# Patient Record
Sex: FEMALE | Race: OTHER | HISPANIC OR LATINO | URBAN - METROPOLITAN AREA
[De-identification: names, ages, dates, MRNs, and addresses within clinical notes are randomized per-mention and may not be internally consistent; named-entity substitution may affect disease eponyms.]

---

## 2019-09-09 VITALS
TEMPERATURE: 98 F | OXYGEN SATURATION: 99 % | RESPIRATION RATE: 18 BRPM | DIASTOLIC BLOOD PRESSURE: 80 MMHG | SYSTOLIC BLOOD PRESSURE: 137 MMHG | HEART RATE: 66 BPM | WEIGHT: 124.34 LBS | HEIGHT: 59 IN

## 2019-09-09 NOTE — ASU PREOP CHECKLIST - 3.
potassium 2.9 - Dr. Arizmendi/ Dr. Thomas aware- patient can go home as per Dr. Arizmendi/ Dr. Raya, Manager LAUREN Meng notified, patient to see Dr. Arizmendi in am at his office

## 2019-09-10 ENCOUNTER — OUTPATIENT (OUTPATIENT)
Dept: OUTPATIENT SERVICES | Facility: HOSPITAL | Age: 59
LOS: 1 days | Discharge: HOME | End: 2019-09-10
Payer: COMMERCIAL

## 2019-09-10 DIAGNOSIS — Z98.890 OTHER SPECIFIED POSTPROCEDURAL STATES: Chronic | ICD-10-CM

## 2019-09-10 DIAGNOSIS — N84.2 POLYP OF VAGINA: Chronic | ICD-10-CM

## 2019-09-10 LAB
ANION GAP SERPL CALC-SCNC: 10 MMOL/L — SIGNIFICANT CHANGE UP (ref 5–17)
BLD GP AB SCN SERPL QL: NEGATIVE — SIGNIFICANT CHANGE UP
BUN SERPL-MCNC: 14 MG/DL — SIGNIFICANT CHANGE UP (ref 7–23)
CALCIUM SERPL-MCNC: 9 MG/DL — SIGNIFICANT CHANGE UP (ref 8.4–10.5)
CHLORIDE SERPL-SCNC: 103 MMOL/L — SIGNIFICANT CHANGE UP (ref 96–108)
CO2 SERPL-SCNC: 28 MMOL/L — SIGNIFICANT CHANGE UP (ref 22–31)
CREAT SERPL-MCNC: 0.73 MG/DL — SIGNIFICANT CHANGE UP (ref 0.5–1.3)
GLUCOSE BLDC GLUCOMTR-MCNC: 106 MG/DL — HIGH (ref 70–99)
GLUCOSE SERPL-MCNC: 109 MG/DL — HIGH (ref 70–99)
POTASSIUM SERPL-MCNC: 2.9 MMOL/L — CRITICAL LOW (ref 3.5–5.3)
POTASSIUM SERPL-SCNC: 2.9 MMOL/L — CRITICAL LOW (ref 3.5–5.3)
RH IG SCN BLD-IMP: POSITIVE — SIGNIFICANT CHANGE UP
SODIUM SERPL-SCNC: 141 MMOL/L — SIGNIFICANT CHANGE UP (ref 135–145)

## 2019-09-10 PROCEDURE — 86901 BLOOD TYPING SEROLOGIC RH(D): CPT

## 2019-09-10 PROCEDURE — 86850 RBC ANTIBODY SCREEN: CPT

## 2019-09-10 PROCEDURE — 57288 REPAIR BLADDER DEFECT: CPT | Mod: 73

## 2019-09-10 PROCEDURE — 82962 GLUCOSE BLOOD TEST: CPT

## 2019-09-10 PROCEDURE — 86900 BLOOD TYPING SEROLOGIC ABO: CPT

## 2019-09-10 PROCEDURE — 80048 BASIC METABOLIC PNL TOTAL CA: CPT

## 2019-09-10 NOTE — CHART NOTE - NSCHARTNOTEFT_GEN_A_CORE
Patient here for scheduled TVT and preop labs significant for K 2.9. Anesthesia consulted and given low K, decision made to cancel the surgery. Plan discussed with Dr Arizmendi bedside. Patient will follow up with her PCP regarding management of K and her HTN on HCTZ.

## 2021-04-12 PROBLEM — I10 ESSENTIAL (PRIMARY) HYPERTENSION: Chronic | Status: ACTIVE | Noted: 2019-09-09

## 2021-04-12 PROBLEM — R01.1 CARDIAC MURMUR, UNSPECIFIED: Chronic | Status: ACTIVE | Noted: 2019-09-09

## 2021-04-12 PROBLEM — R73.9 HYPERGLYCEMIA, UNSPECIFIED: Chronic | Status: ACTIVE | Noted: 2019-09-09

## 2021-04-12 PROBLEM — R32 UNSPECIFIED URINARY INCONTINENCE: Chronic | Status: ACTIVE | Noted: 2019-09-09

## 2021-04-23 ENCOUNTER — APPOINTMENT (OUTPATIENT)
Dept: OTOLARYNGOLOGY | Facility: CLINIC | Age: 61
End: 2021-04-23
Payer: MEDICAID

## 2021-04-23 VITALS
BODY MASS INDEX: 25.2 KG/M2 | SYSTOLIC BLOOD PRESSURE: 160 MMHG | DIASTOLIC BLOOD PRESSURE: 99 MMHG | HEIGHT: 59 IN | WEIGHT: 125 LBS | TEMPERATURE: 98.4 F | HEART RATE: 73 BPM

## 2021-04-23 DIAGNOSIS — H49.20 SIXTH [ABDUCENT] NERVE PALSY, UNSPECIFIED EYE: ICD-10-CM

## 2021-04-23 DIAGNOSIS — Z86.39 PERSONAL HISTORY OF OTHER ENDOCRINE, NUTRITIONAL AND METABOLIC DISEASE: ICD-10-CM

## 2021-04-23 DIAGNOSIS — Z78.9 OTHER SPECIFIED HEALTH STATUS: ICD-10-CM

## 2021-04-23 DIAGNOSIS — R73.03 PREDIABETES.: ICD-10-CM

## 2021-04-23 DIAGNOSIS — Z86.79 PERSONAL HISTORY OF OTHER DISEASES OF THE CIRCULATORY SYSTEM: ICD-10-CM

## 2021-04-23 PROBLEM — Z00.00 ENCOUNTER FOR PREVENTIVE HEALTH EXAMINATION: Status: ACTIVE | Noted: 2021-04-23

## 2021-04-23 PROCEDURE — 92550 TYMPANOMETRY & REFLEX THRESH: CPT

## 2021-04-23 PROCEDURE — 99204 OFFICE O/P NEW MOD 45 MIN: CPT

## 2021-04-23 PROCEDURE — 99072 ADDL SUPL MATRL&STAF TM PHE: CPT

## 2021-04-23 PROCEDURE — 92557 COMPREHENSIVE HEARING TEST: CPT

## 2021-04-23 NOTE — CONSULT LETTER
[Dear  ___] : Dear  [unfilled], [Courtesy Letter:] : I had the pleasure of seeing your patient, [unfilled], in my office today. [Sincerely,] : Sincerely, [Consult Closing:] : Thank you very much for allowing me to participate in the care of this patient.  If you have any questions, please do not hesitate to contact me. [FreeTextEntry3] : Zia Mercer MD\par Department of Otolaryngology - Head and Neck Surgery\par [facility]

## 2021-04-23 NOTE — DATA REVIEWED
[No studies available for review at this time] : No studies available for review at this time [de-identified] : see hpi

## 2021-04-23 NOTE — ASSESSMENT
[FreeTextEntry1] : 61F here for initial evaluation. For the past 4 weeks or so, she reports hearing a loud humming noise in her left ear. There is no otorrhea, otalgia or vertigo, no hearing loss. One week later, she awoke with double vision when she looks to the right and was told that she has an injured nerve in her left eye. She has since seen several specialists and gotten imaging (CT and MRI), but does not have any imaging with her at this time nor is it available to me. Of note, she reports having surgery on her left ear as a child in the 1980s.\par Audiogram from today shows essentially symmetric normal hearing sloping to mild/moderate SNHL rising to normal hearing with sloght asymmetry at 1 and 8lhz, left side worse. On exam, the left TM is intact and postsurgical but mobile. 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 The rest of the complete and comprehensive head and neck exam is unremarkable and all other cranial nerves are intact.\par She has an acute left abducens paralysis in addition to acute left tinnitus. I am unsure if they are related at this time, although possible with various petrous apex pathologies. Audiometry is unremarkable. She will get me copy of her MRI and CT ASAP for review and see me next week. She will also see her ophthalmologist as scheduled as well.

## 2021-04-23 NOTE — PHYSICAL EXAM
[Midline] : trachea located in midline position [Normal] : no rashes [FreeTextEntry1] : Ad: EAC clear, TM intact and mobile, ME clear\par As: EAC clear, TM intact/postsugical?, mobile, ME clear [de-identified] : left abducent paralysis [de-identified] : CN2-5, 7, 10-12 grossly symmetric and intact

## 2021-04-23 NOTE — HISTORY OF PRESENT ILLNESS
[de-identified] : 61F here for initial evaluation.\par \par For the past 4 weeks or so, she reports hearing a loud humming noise in her left ear. There is no otorrhea, otalgia or vertigo, no hearing loss. One week later, she awoke with double vision when she looks to the right and was told that she has an injured nerve in her left eye.\par She has since seen several specialists and gotten imaging (CT and MRI), but does not have any imaging with her at this time nor is it available to me.\par Of note, she reports having surgery on her left ear as a child in the 1980s.\par \par Audiogram from today (asymmetry at 1 and 8khz, left worse):\par R ear: hearing wnl sloping to mild SNHL rising to normal hearing. SRT 30, 100% discrim\par L ear: hearing wnl sloping to mild/moderate SNHL rising to normal hearing, SRT 35, 100% discrim\par \par ROS otherwise unremarkable

## 2021-04-30 RX ORDER — CLONAZEPAM 0.5 MG/1
0.5 TABLET ORAL
Qty: 40 | Refills: 0 | Status: ACTIVE | COMMUNITY
Start: 2021-04-30 | End: 1900-01-01

## 2021-09-09 ENCOUNTER — APPOINTMENT (OUTPATIENT)
Dept: OTOLARYNGOLOGY | Facility: CLINIC | Age: 61
End: 2021-09-09
Payer: MEDICAID

## 2021-09-09 VITALS
SYSTOLIC BLOOD PRESSURE: 150 MMHG | HEIGHT: 59 IN | WEIGHT: 125 LBS | DIASTOLIC BLOOD PRESSURE: 85 MMHG | BODY MASS INDEX: 25.2 KG/M2 | TEMPERATURE: 97.4 F | HEART RATE: 73 BPM

## 2021-09-09 DIAGNOSIS — H93.12 TINNITUS, LEFT EAR: ICD-10-CM

## 2021-09-09 PROCEDURE — 99214 OFFICE O/P EST MOD 30 MIN: CPT

## 2021-09-09 NOTE — HISTORY OF PRESENT ILLNESS
[de-identified] : 61F here in followup.\par \par Since last seen 6-7 months ago, she is doing ok. There is still a loud humming noise in her left ear; this has not changed in character and persists. There is no otorrhea, otalgia or vertigo, no hearing loss. Her prior double vision, however, has nearly completely resolved. Imaging (CT and MRI) are unremarkable.\par Of note, she reports having surgery on her left ear as a child in the 1980s.\par \par Audiogram from 4/2021 (asymmetry at 1 and 8khz, left worse):\par R ear: hearing wnl sloping to mild SNHL rising to normal hearing. SRT 30, 100% discrim\par L ear: hearing wnl sloping to mild/moderate SNHL rising to normal hearing, SRT 35, 100% discrim\par \par ROS otherwise unremarkable

## 2021-09-09 NOTE — DATA REVIEWED
[No studies available for review at this time] : No studies available for review at this time [de-identified] : see hpi

## 2021-09-09 NOTE — ASSESSMENT
[FreeTextEntry1] : 61F here in followup. Since last seen 6-7 months ago, she is doing ok. There is still a loud humming noise in her left ear; this has not changed in character and persists. There is no otorrhea, otalgia or vertigo, no hearing loss. Her prior double vision, however, has nearly completely resolved. Imaging (CT and MRI) are unremarkable. Of note, she reports having surgery on her left ear as a child in the 1980s.61F here for initial evaluation. Audiogram from prior visit shows essentially symmetric normal hearing sloping to mild/moderate SNHL rising to normal hearing with sloght asymmetry at 1 and 8khz, left side worse. On exam, the left TM is intact and postsurgical but mobile. Extraocular muscle movement is grossly intact. The rest of the complete and comprehensive head and neck exam is unremarkable and all other cranial nerves are intact.\par Her prior acute left abducens paralysis has resolved. However, left tinnitus remains. I am unsure if they are related at this time, although possible with various petrous apex pathologies. Prior audiometry and imaging are unremarkable. Discussed lifestyle measures, masking techniques, diet to minimize tinnitus. RTO 6 months in followup.

## 2021-09-09 NOTE — CONSULT LETTER
[Dear  ___] : Dear  [unfilled], [Courtesy Letter:] : I had the pleasure of seeing your patient, [unfilled], in my office today. [Consult Closing:] : Thank you very much for allowing me to participate in the care of this patient.  If you have any questions, please do not hesitate to contact me. [Sincerely,] : Sincerely, [FreeTextEntry3] : Zia Mercer MD\par Department of Otolaryngology - Head and Neck Surgery\par [facility]

## 2021-09-09 NOTE — PHYSICAL EXAM
[Midline] : trachea located in midline position [Normal] : no rashes [de-identified] : EOMI [de-identified] : CN2-5, 7, 10-12 grossly symmetric and intact [FreeTextEntry1] : Ad: EAC clear, TM intact and mobile, ME clear\par As: EAC clear, TM intact/postsugical, mobile, ME clear

## 2022-03-08 ENCOUNTER — APPOINTMENT (OUTPATIENT)
Dept: OTOLARYNGOLOGY | Facility: CLINIC | Age: 62
End: 2022-03-08

## 2022-03-09 ENCOUNTER — APPOINTMENT (OUTPATIENT)
Dept: OTOLARYNGOLOGY | Facility: CLINIC | Age: 62
End: 2022-03-09

## 2024-05-15 ENCOUNTER — APPOINTMENT (OUTPATIENT)
Dept: ORTHOPEDIC SURGERY | Facility: CLINIC | Age: 64
End: 2024-05-15

## 2024-06-12 ENCOUNTER — APPOINTMENT (OUTPATIENT)
Dept: ORTHOPEDIC SURGERY | Facility: CLINIC | Age: 64
End: 2024-06-12
Payer: MEDICAID

## 2024-06-12 VITALS — WEIGHT: 123 LBS | HEIGHT: 59 IN | BODY MASS INDEX: 24.8 KG/M2

## 2024-06-12 DIAGNOSIS — Z82.61 FAMILY HISTORY OF ARTHRITIS: ICD-10-CM

## 2024-06-12 DIAGNOSIS — M21.161 VARUS DEFORMITY, NOT ELSEWHERE CLASSIFIED, RIGHT KNEE: ICD-10-CM

## 2024-06-12 DIAGNOSIS — Z87.39 PERSONAL HISTORY OF OTHER DISEASES OF THE MUSCULOSKELETAL SYSTEM AND CONNECTIVE TISSUE: ICD-10-CM

## 2024-06-12 DIAGNOSIS — Z82.49 FAMILY HISTORY OF ISCHEMIC HEART DISEASE AND OTHER DISEASES OF THE CIRCULATORY SYSTEM: ICD-10-CM

## 2024-06-12 PROCEDURE — 99203 OFFICE O/P NEW LOW 30 MIN: CPT

## 2024-06-26 ENCOUNTER — APPOINTMENT (OUTPATIENT)
Dept: ORTHOPEDIC SURGERY | Facility: CLINIC | Age: 64
End: 2024-06-26
Payer: MEDICAID

## 2024-06-26 VITALS — RESPIRATION RATE: 16 BRPM | BODY MASS INDEX: 24.8 KG/M2 | WEIGHT: 123 LBS | HEIGHT: 59 IN

## 2024-06-26 DIAGNOSIS — M17.11 UNILATERAL PRIMARY OSTEOARTHRITIS, RIGHT KNEE: ICD-10-CM

## 2024-06-26 PROCEDURE — 20610 DRAIN/INJ JOINT/BURSA W/O US: CPT | Mod: RT

## 2024-09-09 ENCOUNTER — APPOINTMENT (OUTPATIENT)
Dept: ORTHOPEDIC SURGERY | Facility: CLINIC | Age: 64
End: 2024-09-09
Payer: MEDICAID

## 2024-09-09 VITALS — BODY MASS INDEX: 24.8 KG/M2 | HEIGHT: 59 IN | WEIGHT: 123 LBS | RESPIRATION RATE: 18 BRPM

## 2024-09-09 DIAGNOSIS — M17.11 UNILATERAL PRIMARY OSTEOARTHRITIS, RIGHT KNEE: ICD-10-CM

## 2024-09-09 DIAGNOSIS — S83.241A OTHER TEAR OF MEDIAL MENISCUS, CURRENT INJURY, RIGHT KNEE, INITIAL ENCOUNTER: ICD-10-CM

## 2024-09-09 PROCEDURE — 99213 OFFICE O/P EST LOW 20 MIN: CPT

## 2024-09-09 RX ORDER — CELECOXIB 100 MG/1
100 CAPSULE ORAL
Qty: 120 | Refills: 0 | Status: ACTIVE | COMMUNITY
Start: 2024-09-09 | End: 1900-01-01